# Patient Record
Sex: MALE | Race: WHITE | NOT HISPANIC OR LATINO | Employment: UNEMPLOYED | URBAN - METROPOLITAN AREA
[De-identification: names, ages, dates, MRNs, and addresses within clinical notes are randomized per-mention and may not be internally consistent; named-entity substitution may affect disease eponyms.]

---

## 2020-04-24 DIAGNOSIS — M25.612 STIFFNESS OF LEFT SHOULDER JOINT: Primary | ICD-10-CM

## 2020-04-26 ENCOUNTER — TELEPHONE (OUTPATIENT)
Dept: GERIATRICS | Facility: CLINIC | Age: 55
End: 2020-04-26

## 2020-04-26 NOTE — TELEPHONE ENCOUNTER
Patient new admit - per patient report took Metformin 1000 mg PO BID and this medication was not listed on discharge orders. This NP has no access to patient's current hospital records. BGs 200s.    PLAN  OK to restart Metformin as PTA, monitor BG and f/u with primary NP tomorrow to clarify diabetic meds.     Electronically signed by MARLYN Abdalla, GNP

## 2020-04-27 RX ORDER — OXYCODONE AND ACETAMINOPHEN 5; 325 MG/1; MG/1
TABLET ORAL
Qty: 120 TABLET | Refills: 0 | Status: SHIPPED | OUTPATIENT
Start: 2020-04-27 | End: 2020-05-01

## 2020-04-28 ENCOUNTER — VIRTUAL VISIT (OUTPATIENT)
Dept: GERIATRICS | Facility: CLINIC | Age: 55
End: 2020-04-28
Payer: COMMERCIAL

## 2020-04-28 VITALS
OXYGEN SATURATION: 98 % | HEART RATE: 106 BPM | DIASTOLIC BLOOD PRESSURE: 75 MMHG | TEMPERATURE: 98 F | HEIGHT: 71 IN | BODY MASS INDEX: 29.76 KG/M2 | SYSTOLIC BLOOD PRESSURE: 143 MMHG | RESPIRATION RATE: 18 BRPM | WEIGHT: 212.6 LBS

## 2020-04-28 DIAGNOSIS — I73.9 PERIPHERAL ARTERIAL DISEASE (H): Primary | ICD-10-CM

## 2020-04-28 DIAGNOSIS — Z51.89 ENCOUNTER FOR WOUND CARE: ICD-10-CM

## 2020-04-28 DIAGNOSIS — E78.5 DYSLIPIDEMIA: ICD-10-CM

## 2020-04-28 DIAGNOSIS — I10 ESSENTIAL HYPERTENSION: ICD-10-CM

## 2020-04-28 DIAGNOSIS — E11.69 TYPE 2 DIABETES MELLITUS WITH OTHER SPECIFIED COMPLICATION, WITHOUT LONG-TERM CURRENT USE OF INSULIN (H): ICD-10-CM

## 2020-04-28 PROBLEM — B18.2 CHRONIC HEPATITIS C (H): Status: ACTIVE | Noted: 2018-05-11

## 2020-04-28 PROBLEM — E78.00 HYPERCHOLESTEROLEMIA: Status: ACTIVE | Noted: 2018-05-11

## 2020-04-28 PROBLEM — G89.4 CHRONIC PAIN DISORDER: Status: ACTIVE | Noted: 2020-04-28

## 2020-04-28 PROBLEM — I70.209 SUPERFICIAL FEMORAL ARTERY OCCLUSION (H): Status: ACTIVE | Noted: 2020-04-13

## 2020-04-28 PROCEDURE — 99309 SBSQ NF CARE MODERATE MDM 30: CPT | Mod: 95 | Performed by: NURSE PRACTITIONER

## 2020-04-28 RX ORDER — IBUPROFEN 200 MG
1000 TABLET ORAL AT BEDTIME
COMMUNITY

## 2020-04-28 RX ORDER — ATORVASTATIN CALCIUM 20 MG/1
20 TABLET, FILM COATED ORAL DAILY
COMMUNITY

## 2020-04-28 RX ORDER — GABAPENTIN 300 MG/1
300 CAPSULE ORAL 3 TIMES DAILY
COMMUNITY

## 2020-04-28 RX ORDER — TAMSULOSIN HYDROCHLORIDE 0.4 MG/1
0.4 CAPSULE ORAL DAILY
COMMUNITY
End: 2020-05-01

## 2020-04-28 RX ORDER — ASPIRIN 81 MG/1
81 TABLET ORAL DAILY
COMMUNITY

## 2020-04-28 RX ORDER — METOPROLOL TARTRATE 25 MG/1
25 TABLET, FILM COATED ORAL 2 TIMES DAILY
COMMUNITY
End: 2020-05-01

## 2020-04-28 RX ORDER — DAPAGLIFLOZIN 10 MG/1
10 TABLET, FILM COATED ORAL DAILY
Status: CANCELLED | OUTPATIENT
Start: 2020-04-28

## 2020-04-28 RX ORDER — DAPAGLIFLOZIN 10 MG/1
10 TABLET, FILM COATED ORAL DAILY
COMMUNITY

## 2020-04-28 RX ORDER — CLOPIDOGREL BISULFATE 75 MG/1
75 TABLET ORAL DAILY
COMMUNITY

## 2020-04-28 RX ORDER — ISOSORBIDE MONONITRATE 30 MG/1
30 TABLET, EXTENDED RELEASE ORAL DAILY
COMMUNITY

## 2020-04-28 ASSESSMENT — MIFFLIN-ST. JEOR: SCORE: 1826.48

## 2020-04-28 NOTE — PROGRESS NOTES
" [facility]  Rakesh Knight is being evaluated via a billable video visit due to the restrictions of the Covid-19 pandemic.   The patient has been notified of following:  \"This video visit will be conducted via a call between you and your provider. We have found that certain health care needs can be provided without the need for an in-person physical exam.  This service lets us provide the care you need with a video conversation. If during the course of the call the provider feels a video visit is not appropriate, you will not be charged for this service.\"   The provider has received verbal consent for a Video Visit from the patient and or first contact? Yes  Patient/facility staff would like the video invitation sent by: N/A   Video Start Time: 330  Which Facility the Patient is at during the time of visit: [facility]      Received verbal consent to use Care Everywhere in order to access labs, documents, histories, and all other needed information to provide care at current facility.      PRIMARY CARE PROVIDER AND CLINIC:  Gabriela Bansal MD (Inactive), None  Chief Complaint   Patient presents with     Video Visit     Hospital F/U     [facility] Medical Record Number:  [id number]  Rakesh Knight  is a 54 year old  (1965), admitted to the above facility from  [facility]. Hospital stay 4/13/20 through 4/24/20..  Admitted to this facility for  rehab, medical management and nursing care.  HPI:    HPI information obtained from: facility chart records, facility staff, patient report and Care Everywhere Epic chart review.   Brief Summary of Hospital Course: admitted for peripheral arterial disease with lower extremity pain and numbness. Has superficial femoral artery occlusion.  Copied from Discharge summary: interventional angiogram with thrombectomy (4/14). He had re-thrombosis shortly afterwards, requiring a second angiogram with thrombectomy " (4/14). The following day, he had a left femoral-tibial bypass in situ with repair of a left femoral artery dissection (4/15). ? He had to go to the operating room several times to control bleeding and reestablish blood flow. Post operative care per surgery (pain medications, antibiotics, anticoagulation). his Wound vac was removed and he was discharged    Updates on Status Since Skilled nursing Admission: 4/28 since admission the sildenafil was discontinued and metformin restarted. The farxiga was restarted but is not covered by insurance.     CODE STATUS/ADVANCE DIRECTIVES DISCUSSION:   CPR/Full code   Patient's living condition: lives with spouse  ALLERGIES: Norcet [hydrocodone-acetaminophen]  PAST MEDICAL HISTORY:  has a past medical history of Hep C w/ coma, chronic (H), Left shoulder pain, and Sleep apnea.  PAST SURGICAL HISTORY:   has a past surgical history that includes Foot surgery; Hand surgery; Arthroscopy shoulder rotator cuff repair (11/5/2013); Arthroscopy shoulder biceps tenodesis repair (11/5/2013); and Arthroscopy shoulder capsular repair (4/22/2014).  FAMILY HISTORY: family history includes Hypertension in his mother.  SOCIAL HISTORY:   reports that he has quit smoking. He has never used smokeless tobacco. He reports that he does not drink alcohol or use drugs.  Current Outpatient Medications   Medication Sig Dispense Refill     aspirin 81 MG EC tablet Take 81 mg by mouth daily       atorvastatin (LIPITOR) 20 MG tablet Take 20 mg by mouth daily       clopidogrel (PLAVIX) 75 MG tablet Take 75 mg by mouth daily       dapagliflozin (FARXIGA) 10 MG TABS tablet Take 10 mg by mouth daily       gabapentin (NEURONTIN) 300 MG capsule Take 300 mg by mouth 3 times daily       ibuprofen (ADVIL/MOTRIN) 200 MG tablet Take 1,000 mg by mouth At Bedtime       isosorbide mononitrate (IMDUR) 30 MG 24 hr tablet Take 30 mg by mouth daily       linagliptin-metFORMIN ER (JENTADUETO XR) 2.5-1000 MG per table Take 1  "tablet by mouth 2 times daily       metoprolol tartrate (LOPRESSOR) 25 MG tablet Take 25 mg by mouth 2 times daily       oxyCODONE-acetaminophen (PERCOCET) 5-325 MG tablet TAKE 1 TABLET BY MOUTH EVERY 6 HOURS AS NEEDED 120 tablet 0     rivaroxaban ANTICOAGULANT (XARELTO) 20 MG TABS tablet Take 20 mg by mouth daily (with dinner)       tamsulosin (FLOMAX) 0.4 MG capsule Take 0.4 mg by mouth daily       ROS: 4 point ROS including Respiratory, CV, GI and , other than that noted in the HPI,  is negative  Vitals:BP (!) 143/75   Pulse 106   Temp 98  F (36.7  C)   Resp 18   Ht 1.803 m (5' 11\")   Wt 96.4 kg (212 lb 9.6 oz)   SpO2 98%   BMI 29.65 kg/m     Limited Visit Exam done given COVID-19 precautions:  Alert, in no distress. Able to move toes and doriflexion.     Lab/Diagnostic data:  none    ASSESSMENT/PLAN:  Peripheral arterial disease (H)  Admitted to tcu after hospital stay for superficial femoral artery occlusion. Therapy and patient report less pain, no numbness, good color to foot on the left. Using a splint with ambulation for foot drop. Has a follow up with surgeon on Tuesday. Currently has gabapentin and prn percocet for pain.     Essential hypertension  Controlled. Continue metoprolol, imdur.     Type 2 diabetes mellitus with other specified complication, without long-term current use of insulin (H)  Controlled. 4/9/20 5.8 per patient report. Good control in last 6 months with metformin and farxiga. Will hold farxiga until goes home.     Dyslipidemia  Continues on statin.     Encounter for wound care  Has daily wound care. CHANGE DAILY: PAINT LEFT LEG INCISION WITH BETADINE AND COVER WITH GAUZE 4X4 DRESSING AND ROLL KERLEX DRESSING. CHANGE DAILY AND PRN TO KEEP DRY ESPECIALLY AT GROIN AREA. WRAP LLE GENTLY WITH ACE WRAP. 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      Orders:  No changes     Electronically signed by:  Dacia Vee NP     Video-Visit " Details  Type of service:  Video Visit  Video End Time (time video stopped): 342pm  Distant Location (provider location):  [facility]

## 2020-05-01 ENCOUNTER — TELEPHONE (OUTPATIENT)
Dept: GERIATRICS | Facility: CLINIC | Age: 55
End: 2020-05-01

## 2020-05-01 DIAGNOSIS — I10 ESSENTIAL HYPERTENSION: ICD-10-CM

## 2020-05-01 DIAGNOSIS — M25.612 STIFFNESS OF LEFT SHOULDER JOINT: ICD-10-CM

## 2020-05-01 DIAGNOSIS — I73.9 PERIPHERAL ARTERIAL DISEASE (H): Primary | ICD-10-CM

## 2020-05-01 RX ORDER — TAMSULOSIN HYDROCHLORIDE 0.4 MG/1
0.4 CAPSULE ORAL DAILY
Qty: 30 CAPSULE | Refills: 0 | Status: SHIPPED | OUTPATIENT
Start: 2020-05-01

## 2020-05-01 RX ORDER — OXYCODONE AND ACETAMINOPHEN 5; 325 MG/1; MG/1
TABLET ORAL
Qty: 40 TABLET | Refills: 0 | Status: SHIPPED | OUTPATIENT
Start: 2020-05-01

## 2020-05-01 RX ORDER — METOPROLOL TARTRATE 25 MG/1
25 TABLET, FILM COATED ORAL 2 TIMES DAILY
Qty: 60 TABLET | Refills: 0 | Status: SHIPPED | OUTPATIENT
Start: 2020-05-01

## 2020-05-01 NOTE — TELEPHONE ENCOUNTER
[facility] TELEPHONE ENCOUNTER    Rakesh Knight is a 54 year old  (1965),Nurse called today to report: discharging today from nursing home  and needs the following scripts    ASSESSMENT/PLAN  Stiffness of left shoulder joint    - oxyCODONE-acetaminophen (PERCOCET) 5-325 MG tablet; TAKE 1 TABLET BY MOUTH EVERY 6 HOURS AS NEEDED    Peripheral arterial disease (H)    - tamsulosin (FLOMAX) 0.4 MG capsule; Take 1 capsule (0.4 mg) by mouth daily    Essential hypertension    - metoprolol tartrate (LOPRESSOR) 25 MG tablet; Take 1 tablet (25 mg) by mouth 2 times daily      Dacia Vee NP

## 2022-08-09 ENCOUNTER — TELEPHONE (OUTPATIENT)
Dept: FAMILY MEDICINE | Facility: CLINIC | Age: 57
End: 2022-08-09

## 2022-08-09 ENCOUNTER — TELEPHONE (OUTPATIENT)
Dept: ORTHOPEDICS | Facility: CLINIC | Age: 57
End: 2022-08-09

## 2022-08-09 NOTE — TELEPHONE ENCOUNTER
Domonique from [facility] insurance worker's comp calling and is returning a call to Gerry. No notes found and does not appear pt is a [facility] pt. Domonique will look into this further and call back if needed.    Polina Mendoza RN  [facility]

## 2022-10-20 ENCOUNTER — HOSPITAL ENCOUNTER (EMERGENCY)
Facility: CLINIC | Age: 57
Discharge: HOME | End: 2022-10-21
Attending: EMERGENCY MEDICINE | Admitting: EMERGENCY MEDICINE
Payer: COMMERCIAL

## 2022-10-20 VITALS
WEIGHT: 212 LBS | TEMPERATURE: 97 F | OXYGEN SATURATION: 99 % | RESPIRATION RATE: 20 BRPM | BODY MASS INDEX: 29.57 KG/M2 | HEART RATE: 87 BPM | DIASTOLIC BLOOD PRESSURE: 101 MMHG | SYSTOLIC BLOOD PRESSURE: 147 MMHG

## 2022-10-20 DIAGNOSIS — G89.29 OTHER CHRONIC PAIN: ICD-10-CM

## 2022-10-20 DIAGNOSIS — M79.662 PAIN OF LEFT LOWER LEG: ICD-10-CM

## 2022-10-20 DIAGNOSIS — Z79.01 ANTICOAGULATED ON COUMADIN: ICD-10-CM

## 2022-10-20 LAB — INR PPP: 2.63 (ref 0.85–1.15)

## 2022-10-20 PROCEDURE — 99283 EMERGENCY DEPT VISIT LOW MDM: CPT

## 2022-10-20 PROCEDURE — 85610 PROTHROMBIN TIME: CPT | Performed by: EMERGENCY MEDICINE

## 2022-10-20 PROCEDURE — 36415 COLL VENOUS BLD VENIPUNCTURE: CPT | Performed by: EMERGENCY MEDICINE

## 2022-10-20 ASSESSMENT — ACTIVITIES OF DAILY LIVING (ADL): ADLS_ACUITY_SCORE: 33

## 2022-10-20 ASSESSMENT — ENCOUNTER SYMPTOMS
FEVER: 0
SHORTNESS OF BREATH: 0
CHILLS: 0
WEAKNESS: 0
NUMBNESS: 1
COUGH: 0

## 2022-10-20 NOTE — Clinical Note
Rakesh Knight was seen and treated in our emergency department on 10/20/2022.  He may return to work on 10/24/2022.       If you have any questions or concerns, please don't hesitate to call.      Mansi Torres MD

## 2022-10-21 NOTE — ED TRIAGE NOTES
Presents to triage with c/o LLE pain that has been on going for a few weeks but worsening today. Hx of PAD, stated this pain feels similar. Pedal pulses palpable in triage, temp same BLE.      Triage Assessment     Row Name 10/20/22 1932       Triage Assessment (Adult)    Airway WDL WDL       Respiratory WDL    Respiratory WDL WDL       Cardiac WDL    Cardiac WDL WDL

## 2022-10-21 NOTE — ED PROVIDER NOTES
History     Chief Complaint:  Leg Pain       HPI   Rakesh Knight is a 57 year old male with a history of chronic leg pain due to peripheral neuropathies and PAD who presents with increased left leg pain today. He had previous vascular surgery of the LLE as well as is managed by a pain clinic. He takes gabapentin and multiple other medications including warfarin and plavix. He has not missed any doses. He notes he typically runs high with his INR (4 range). He notes his leg seemed swollen as well earlier and he became nervous about worsening disease, so came to the emergency room. He denies fever or new color change. He has chronic numbness of his toes, unchanged. He denies chest pain or shortness of breath. He notes he is on his feet all day at work and this does not help with his pain.     ROS:  Review of Systems   Constitutional: Negative for chills and fever.   Respiratory: Negative for cough and shortness of breath.    Cardiovascular: Positive for leg swelling. Negative for chest pain.   Musculoskeletal:        Positive for acute on chronic left leg pain   Neurological: Positive for numbness (chronic). Negative for weakness.   All other systems reviewed and are negative.      Allergies:  Norcet [Hydrocodone-Acetaminophen]     Medications:    aspirin 81 MG EC tablet  atorvastatin (LIPITOR) 20 MG tablet  clopidogrel (PLAVIX) 75 MG tablet  dapagliflozin (FARXIGA) 10 MG TABS tablet  gabapentin (NEURONTIN) 300 MG capsule  ibuprofen (ADVIL/MOTRIN) 200 MG tablet  isosorbide mononitrate (IMDUR) 30 MG 24 hr tablet  linagliptin-metFORMIN ER (JENTADUETO XR) 2.5-1000 MG per table  metoprolol tartrate (LOPRESSOR) 25 MG tablet  oxyCODONE-acetaminophen (PERCOCET) 5-325 MG tablet  rivaroxaban ANTICOAGULANT (XARELTO) 20 MG TABS tablet  tamsulosin (FLOMAX) 0.4 MG capsule        Past Medical History:    Past Medical History:   Diagnosis Date     Hep C w/ coma, chronic (H)      Left shoulder pain      Sleep apnea         Past Surgical History:    Past Surgical History:   Procedure Laterality Date     ARTHROSCOPY SHOULDER BICEPS TENODESIS REPAIR  11/5/2013    Procedure: ARTHROSCOPY SHOULDER BICEPS TENODESIS REPAIR;;  Surgeon: Paloma Perkins MD;  [facility]     ARTHROSCOPY SHOULDER CAPSULAR REPAIR  4/22/2014    Procedure: ARTHROSCOPY SHOULDER CAPSULAR REPAIR;  Surgeon: Paloma Perkins MD;  [facility]     ARTHROSCOPY SHOULDER ROTATOR CUFF REPAIR  11/5/2013    Procedure: ARTHROSCOPY SHOULDER ROTATOR CUFF REPAIR;  Left Arthroscopic Rotator Cuff Repair, Biceps Tenodesis, Subacromial Decompression;  Surgeon: Paloma Perkins MD;  Location: [facility]     FOOT SURGERY      x3     HAND SURGERY          Family History:    family history includes Hypertension in his mother.    Social History:   reports that he has quit smoking. He has never used smokeless tobacco. He reports that he does not drink alcohol and does not use drugs.  PCP: Gabriela Bansal     Physical Exam     Patient Vitals for the past 24 hrs:   BP Temp Temp src Pulse Resp SpO2 Weight   10/20/22 1932 (!) 167/86 97  F (36.1  C) Temporal 114 18 98 % 96.2 kg (212 lb)        Physical Exam  General: Adult male sitting upright  CV: DP pulse intact left foot.  Regular rate and rhythm.  Resp:Normal respiratory effort.  MSK: No edema.  No focal tenderness, with mild generalized discomfort and pinching of the left toes.  Limited but active range of motion intact of the left ankle and toes.  Skin: Warm and dry.  Mild erythema of the distal left toes with a intact cap refill of the digits of the left foot.  No spreading erythema.  No necrosis.  No palpable fluctuance.  Neuro: Alert and oriented. Responds appropriately to all questions and commands.  Unable to sense light touch over the all digits of the left foot.  Psych: Normal mood and affect. Pleasant.    Emergency Department Course   Laboratory:  Labs Ordered and Resulted from Time of ED Arrival to  Time of ED Departure   INR - Abnormal       Result Value    INR 2.63 (*)         Emergency Department Course:    Reviewed:  I reviewed nursing notes, vitals and past medical history    Assessments:   I obtained history and examined the patient as noted above.    I rechecked the patient and explained findings.    Disposition:  The patient was discharged to home.     Impression & Plan      Medical Decision Making:  Rakesh Gagnon is a 57-year-old male with history of chronic leg pain, peripheral neuropathies, and PAD who presents emergency department increased left leg pain.  He has palpable pulse on my examination today and his leg is warm and well-perfused.  No evidence of acute limb threatening pathology.  He is appropriately anticoagulated, so doubt new advancing clot.  Ultrasound did not seem indicated at this time noted new arterial studies emergently.  Suspect multi factorial cause for his pain.  There is still some room to increase gabapentin dosing, so this was discussed with the patient.  He is otherwise recommended to contact his vascular team for reassessment and chronic pain.  He felt comfortable's plan was discharged home in stable condition.    Diagnosis:    ICD-10-CM    1. Pain of left lower leg  M79.662       2. Other chronic pain  G89.29       3. Anticoagulated on Coumadin  Z79.01                 10/20/2022   Mansi Torres MD Jonkman, Tracy Dianne, MD  10/24/22 1117

## 2022-10-21 NOTE — DISCHARGE INSTRUCTIONS
Consider increasing gabapentin to 1.2mg three times daily (start with nighttime dose, slowly increase to all 1.2mg doses as tolerated).